# Patient Record
Sex: FEMALE | Race: WHITE | Employment: PART TIME | URBAN - METROPOLITAN AREA
[De-identification: names, ages, dates, MRNs, and addresses within clinical notes are randomized per-mention and may not be internally consistent; named-entity substitution may affect disease eponyms.]

---

## 2021-12-15 ENCOUNTER — TELEPHONE (OUTPATIENT)
Dept: FAMILY MEDICINE CLINIC | Facility: CLINIC | Age: 46
End: 2021-12-15

## 2021-12-15 ENCOUNTER — HOSPITAL ENCOUNTER (EMERGENCY)
Age: 46
Discharge: HOME | End: 2021-12-15
Attending: EMERGENCY MEDICINE
Payer: MEDICAID

## 2021-12-15 VITALS
TEMPERATURE: 98 F | WEIGHT: 140 LBS | HEART RATE: 97 BPM | BODY MASS INDEX: 21.22 KG/M2 | RESPIRATION RATE: 18 BRPM | DIASTOLIC BLOOD PRESSURE: 81 MMHG | OXYGEN SATURATION: 100 % | SYSTOLIC BLOOD PRESSURE: 132 MMHG | HEIGHT: 68 IN

## 2021-12-15 DIAGNOSIS — U07.1 COVID-19: Primary | ICD-10-CM

## 2021-12-15 PROCEDURE — 99283 EMERGENCY DEPT VISIT LOW MDM: CPT

## 2021-12-15 RX ORDER — ALBUTEROL SULFATE 90 UG/1
AEROSOL, METERED RESPIRATORY (INHALATION) EVERY 6 HOURS PRN
COMMUNITY

## 2021-12-15 NOTE — ED PROVIDER NOTES
Patient Seen in: THE [facility] Emergency Department In [place]      History   Patient presents with:  Cough/URI: cough, headache. would like to be checked out and swabbed for covid    Stated Complaint:     Subjective:   HPI    80-year-old woman history of as speech is normal        ED Course     Labs Reviewed   RAPID SARS-COV-2 BY PCR - Abnormal; Notable for the following components:       Result Value    Rapid SARS-CoV-2 by PCR Detected (*)     All other components within normal limits                 MDM

## 2021-12-16 NOTE — TELEPHONE ENCOUNTER
Followed up with patient . Reports she is feeling better. Denies SOB, difficulty breathing, N/V/D, fever. Still has some fatigue, congestion and mild cough. She has made appt to establish care with Dr. Nicky Dominguez next month.

## 2021-12-16 NOTE — TELEPHONE ENCOUNTER
Please call patient and inform her that the emergency department physician [address] her chart to me presumably because she does not have a PCP listed in her chart.   Patient was seen at the [facility] emergency department and diagnosed with SARS-CoV-2/CO

## 2022-01-19 ENCOUNTER — OFFICE VISIT (OUTPATIENT)
Dept: FAMILY MEDICINE CLINIC | Facility: CLINIC | Age: 47
End: 2022-01-19
Payer: MEDICAID

## 2022-01-19 VITALS
DIASTOLIC BLOOD PRESSURE: 76 MMHG | SYSTOLIC BLOOD PRESSURE: 110 MMHG | BODY MASS INDEX: 22.5 KG/M2 | WEIGHT: 140 LBS | HEART RATE: 71 BPM | HEIGHT: 66 IN | RESPIRATION RATE: 18 BRPM | TEMPERATURE: 97 F | OXYGEN SATURATION: 98 %

## 2022-01-19 DIAGNOSIS — Z23 NEED FOR VACCINATION: ICD-10-CM

## 2022-01-19 DIAGNOSIS — Z12.11 SCREENING FOR MALIGNANT NEOPLASM OF COLON: ICD-10-CM

## 2022-01-19 DIAGNOSIS — Z12.4 ENCOUNTER FOR SCREENING FOR CERVICAL CANCER: ICD-10-CM

## 2022-01-19 DIAGNOSIS — F17.210 CIGARETTE SMOKER: ICD-10-CM

## 2022-01-19 DIAGNOSIS — Z12.31 ENCOUNTER FOR SCREENING MAMMOGRAM FOR MALIGNANT NEOPLASM OF BREAST: ICD-10-CM

## 2022-01-19 DIAGNOSIS — E01.0 THYROMEGALY: ICD-10-CM

## 2022-01-19 DIAGNOSIS — Z28.21 REFUSED INFLUENZA VACCINE: ICD-10-CM

## 2022-01-19 DIAGNOSIS — Z71.6 ENCOUNTER FOR SMOKING CESSATION COUNSELING: ICD-10-CM

## 2022-01-19 DIAGNOSIS — J31.0 CHRONIC RHINITIS: ICD-10-CM

## 2022-01-19 DIAGNOSIS — Z28.21 COVID-19 VACCINATION REFUSED: ICD-10-CM

## 2022-01-19 DIAGNOSIS — J41.0 SMOKERS' COUGH (HCC): ICD-10-CM

## 2022-01-19 DIAGNOSIS — Z01.419 ENCOUNTER FOR ROUTINE GYNECOLOGICAL EXAMINATION WITH PAPANICOLAOU SMEAR OF CERVIX: Primary | ICD-10-CM

## 2022-01-19 DIAGNOSIS — Z00.00 ROUTINE GENERAL MEDICAL EXAMINATION AT A HEALTH CARE FACILITY: ICD-10-CM

## 2022-01-19 DIAGNOSIS — J45.30 MILD PERSISTENT ASTHMA WITHOUT COMPLICATION: ICD-10-CM

## 2022-01-19 PROCEDURE — 99386 PREV VISIT NEW AGE 40-64: CPT | Performed by: FAMILY MEDICINE

## 2022-01-19 PROCEDURE — 90715 TDAP VACCINE 7 YRS/> IM: CPT | Performed by: FAMILY MEDICINE

## 2022-01-19 PROCEDURE — 87624 HPV HI-RISK TYP POOLED RSLT: CPT | Performed by: FAMILY MEDICINE

## 2022-01-19 PROCEDURE — 99406 BEHAV CHNG SMOKING 3-10 MIN: CPT | Performed by: FAMILY MEDICINE

## 2022-01-19 PROCEDURE — 88175 CYTOPATH C/V AUTO FLUID REDO: CPT | Performed by: FAMILY MEDICINE

## 2022-01-19 PROCEDURE — 99213 OFFICE O/P EST LOW 20 MIN: CPT | Performed by: FAMILY MEDICINE

## 2022-01-19 PROCEDURE — 90471 IMMUNIZATION ADMIN: CPT | Performed by: FAMILY MEDICINE

## 2022-01-19 PROCEDURE — 3008F BODY MASS INDEX DOCD: CPT | Performed by: FAMILY MEDICINE

## 2022-01-19 PROCEDURE — 3078F DIAST BP <80 MM HG: CPT | Performed by: FAMILY MEDICINE

## 2022-01-19 PROCEDURE — 3074F SYST BP LT 130 MM HG: CPT | Performed by: FAMILY MEDICINE

## 2022-01-19 NOTE — PATIENT INSTRUCTIONS
Prevention Guidelines, Women Ages 36 to 52  Screening tests and vaccines are an important part of managing your health. A screening test is done to find diseases in people who don't have any symptoms.  The goal is to find a disease early so lifestyle ch sigmoidoscopy every 5 years, or  · Colonoscopy every 10 years, or  · CT colonography (virtual colonoscopy) every 5 years, or  · Yearly fecal occult blood test, or  · Yearly fecal immunochemical test every year, or  · Stool DNA test, every 3 years or  · James given at least 4 weeks after the first dose   Hepatitis A Women at increased risk for infection: talk with your healthcare provider 2 doses given 6 months apart   Hepatitis B Women at increased risk for infection: talk with your healthcare provider 3 doses rights reserved. This information is not intended as a substitute for professional medical care. Always follow your healthcare professional's instructions.

## 2022-01-19 NOTE — PROGRESS NOTES
HPI:   Delvin Covarrubias is a 55year old female     Asthma:  Stable. May have been hospitalized 1 time as a teenager or in her 25s. Uses albuterol with relief. Currently not on any maintenance medications.     [facility] 1/19/20[facility] Comment: 1-2 times a weeks go out and have a few drinks    Drug use: Yes      Frequency: 1.0 times per week      Types: Cannabis    Occ:  at [employer]. Marital Status: single. Children: 3. Exercise: physically active at work.   Diet: doesn't wa non tender to palpation. No masses, HSM, or pulsations appreciated. : External genitalia normal. Speculum exam: Vagina normal, normal vaginal mucosa normal discharge, and normal cervix. Specimen obtained for pap and high-risk HPV.    Bimanual exam:  Adn (14)  - LIPID PANEL  - ASSAY, THYROID STIM HORMONE  - FREE T4 (FREE THYROXINE)    3. COVID-19 vaccination refused  Patient refused    4. Refused influenza vaccine  Patient refused    5.  Encounter for screening for cervical cancer  - THINPREP PAP SMEAR B; F for Blood test results, thyroid ultrasound result, and any other issues or concerns.

## 2022-01-19 NOTE — PROGRESS NOTES
Tobacco Cessation Documentation (Smoking and Smokeless included): I had an in depth therapy session with Jacquie Lopezmarina about her tobacco use risks and options using the [employer]'s Five A's approach:    Ask: Doreen Abreu is using tobacco products. Assess:  We a

## 2022-01-20 LAB — HPV I/H RISK 1 DNA SPEC QL NAA+PROBE: NEGATIVE

## 2022-02-02 ENCOUNTER — TELEPHONE (OUTPATIENT)
Dept: FAMILY MEDICINE CLINIC | Facility: CLINIC | Age: 47
End: 2022-02-02

## 2022-02-02 RX ORDER — METRONIDAZOLE 500 MG/1
500 TABLET ORAL EVERY 12 HOURS
Qty: 14 TABLET | Refills: 0 | Status: SHIPPED | OUTPATIENT
Start: 2022-02-02

## 2022-02-02 NOTE — TELEPHONE ENCOUNTER
----- Message from Rand Ruiz DO sent at 2/1/2022  4:57 PM CST -----  Please inform patient that Pap and high-risk HPV are negative. However a shift in mike suggestive of bacterial vaginosis is reported. If patient is having symptoms consistent with bacterial vaginosis recommend prescription for metronidazole 500 mg every 12 hours for 7 days, #14, no refill. Please instruct patient do not drink any alcohol when taking this medication and for 72 hours after the last dose.

## 2025-01-21 DIAGNOSIS — Z02.9 ADMINISTRATIVE ENCOUNTER: Primary | ICD-10-CM

## 2025-01-22 ENCOUNTER — PATIENT OUTREACH (OUTPATIENT)
Dept: CASE MANAGEMENT | Age: 50
End: 2025-01-22

## 2025-01-22 NOTE — PROCEDURES
The office order for PCP removal request is Approved and finalized on January 22, 2025.    Removed Nelly Howell DO as the patient's Primary Care Physician

## (undated) NOTE — LETTER
[facility] EMERGENCY DEPARTMENT IN [facility]  [phone]     Patient: Neftaly Patterson   YOB: 1975   Date of Visit: 12/15/2021     Dear Employer,        December 15, 2021    At [facility], COVID-19 symptoms may discontinue isolation and other precautions 10 days after the date of their first positive RT-PCR test for SARS-CoV-2 RNA.     Persons who are asymptomatic but have been exposed, CDC recommends 14 days of quarantine after exposure base

## (undated) NOTE — LETTER
ASTHMA ACTION PLAN for Melissa Felty     : 1975     Date: 2022  Provider:  Christie Allison DO  Phone for doctor or clinic: [facility], [address] [place] [address], [address] [place] [address]  [facility] [address]  [address] [facility]  [phone]    [employer] [facility] Cesar Mcdowell,    Patient Caretaker